# Patient Record
Sex: FEMALE
[De-identification: names, ages, dates, MRNs, and addresses within clinical notes are randomized per-mention and may not be internally consistent; named-entity substitution may affect disease eponyms.]

---

## 2022-08-19 ENCOUNTER — TRANSCRIPTION ENCOUNTER (OUTPATIENT)
Age: 52
End: 2022-08-19

## 2022-08-19 ENCOUNTER — NON-APPOINTMENT (OUTPATIENT)
Age: 52
End: 2022-08-19

## 2022-08-19 ENCOUNTER — APPOINTMENT (OUTPATIENT)
Dept: NEUROLOGY | Facility: CLINIC | Age: 52
End: 2022-08-19

## 2022-08-19 DIAGNOSIS — G43.711 CHRONIC MIGRAINE W/OUT AURA, INTRACTABLE, WITH STATUS MIGRAINOSUS: ICD-10-CM

## 2022-08-19 DIAGNOSIS — G43.001 MIGRAINE W/OUT AURA, NOT INTRACTABLE, WITH STATUS MIGRAINOSUS: ICD-10-CM

## 2022-08-19 DIAGNOSIS — Z86.59 PERSONAL HISTORY OF OTHER MENTAL AND BEHAVIORAL DISORDERS: ICD-10-CM

## 2022-08-19 DIAGNOSIS — Z82.3 FAMILY HISTORY OF STROKE: ICD-10-CM

## 2022-08-19 PROCEDURE — 99204 OFFICE O/P NEW MOD 45 MIN: CPT

## 2022-08-19 RX ORDER — ZOLPIDEM TARTRATE 10 MG/1
10 TABLET, FILM COATED ORAL
Refills: 0 | Status: ACTIVE | COMMUNITY

## 2022-08-19 RX ORDER — ALPRAZOLAM 0.5 MG/1
0.5 TABLET ORAL
Refills: 0 | Status: ACTIVE | COMMUNITY

## 2022-08-19 RX ORDER — UBROGEPANT 100 MG/1
100 TABLET ORAL
Qty: 10 | Refills: 5 | Status: ACTIVE | COMMUNITY
Start: 2022-08-19

## 2022-08-19 RX ORDER — CITALOPRAM HYDROBROMIDE 10 MG/1
10 TABLET, FILM COATED ORAL
Refills: 0 | Status: ACTIVE | COMMUNITY

## 2022-08-19 NOTE — HISTORY OF PRESENT ILLNESS
[Chronic Headache] : chronic headache [> 15 days per month] : > 15 days per month [> 72 hours] : > 72 hours [stayed the same] : stayed the same [7] : a current pain level of 7/10 [9] : a maximum pain level of 9/10 [Increased] : Overall, patient's activity level has increased [___ On how many days in the last 3 months did you miss work or school because of your headaches?] : On how many days in the last 3 months did you miss work or school because of your headaches? [unfilled] day(s) [___ How many days in the last 3 months was your productivity at work or school reduced by half or more] : How many days in the last 3 months was your productivity at work or school reduced by half or more because of your headaches? [unfilled] day(s) [___ On how many days in the last 3 months did you not do household work because of your headaches?] : On how many days in the last 3 months did you not do household work because of your headaches? [unfilled] day(s) [___ How many days in the last 3 months was your productivity in household work reduced by half or more] : How many days in the last 3 months was your productivity in household work reduced by half or more because of headaches? [unfilled] day(s) [___ On how many days in the last 3 months did you miss family, social or leisure activities because of your headaches?] : On how many days in the last 3 months did you miss family, social or leisure activities because of your headaches? [unfilled] day(s) [___ On how many days in the last 3 months did you have a headache?] : On how many days in the last 3 months did you have a headache? [unfilled] days [___ On a scale of 1-10, on average how painful were these headaches? (0 = no pain and 10 = pain as bad as it can be)] : On a scale of 1-10, on average how painful were these headaches? [unfilled] of 10 [0 to 5, MIDAS Grade I, Little or no disability] : 0 to 5, MIDAS Grade I, Little or no disability [Worsened] : The patient reports ~his/her~ symptoms since the last visit have worsened [FreeTextEntry1] : 52-year-old woman with a history of anxiety, migraine headaches, reports since March of this year, been having increasing frequency persistence of headache, described as a sharp, throbbing pain over the right eye can extend backwards, can last the entire day.  Can wake up with it, go to bed with it.  Has tried Tylenol, Excedrin, which mitigates the pain, or returns.  More recently takes Ubrelvy 50 mg, which helps, but usually has to take a second dose.\par Headaches occasional associated with light headedness, dizziness.  No change in vision, no trouble speaking, no unilateral weakness or numbness of the face or extremities.  History of right TMJ pain dysfunction, does not seem correlated to the pain.\par Recently saw an ENT physician, evaluated her, and did not think was related to her headaches.\par For the last several months she has not moved in with her mother, who recently had a stroke in March, and is now taking care of her, she works also full-time from home.  She lives in a small apartment with her  who apparently is having some difficulty accepting the situation.\par She is having trouble sleeping, feels under a lot more stress.  Presently on citalopram 10 mg daily, takes alprazolam as needed and Ambien to help her sleep. [Aura] : no aura [Dizziness] : no dizziness [Nausea] : no nausea [Vomiting] : no Vomiting [Photophobia] : no photophobia [Phonophobia] : no phonophobia [Neck Pain] : no neck pain [Scotoma] : no scotoma [Numbness] : no numbness [Tingling] : no tingling [Weakness] : no weakness [Scalp Tenderness] : no scalp tenderness

## 2022-08-19 NOTE — REVIEW OF SYSTEMS
[As Noted in HPI] : as noted in HPI [Sleep Disturbances] : sleep disturbances [Anxiety] : anxiety [Negative] : Heme/Lymph [FreeTextEntry4] : Sinus disease

## 2022-08-19 NOTE — PHYSICAL EXAM
[General Appearance - Alert] : alert [General Appearance - In No Acute Distress] : in no acute distress [Oriented To Time, Place, And Person] : oriented to person, place, and time [Impaired Insight] : insight and judgment were intact [Affect] : the affect was normal [Person] : oriented to person [Place] : oriented to place [Time] : oriented to time [Concentration Intact] : normal concentrating ability [Visual Intact] : visual attention was ~T not ~L decreased [Naming Objects] : no difficulty naming common objects [Repeating Phrases] : no difficulty repeating a phrase [Writing A Sentence] : no difficulty writing a sentence [Fluency] : fluency intact [Comprehension] : comprehension intact [Reading] : reading intact [Past History] : adequate knowledge of personal past history [Cranial Nerves Optic (II)] : visual acuity intact bilaterally,  visual fields full to confrontation, pupils equal round and reactive to light [Cranial Nerves Oculomotor (III)] : extraocular motion intact [Cranial Nerves Trigeminal (V)] : facial sensation intact symmetrically [Cranial Nerves Facial (VII)] : face symmetrical [Cranial Nerves Vestibulocochlear (VIII)] : hearing was intact bilaterally [Cranial Nerves Glossopharyngeal (IX)] : tongue and palate midline [Cranial Nerves Accessory (XI - Cranial And Spinal)] : head turning and shoulder shrug symmetric [Cranial Nerves Hypoglossal (XII)] : there was no tongue deviation with protrusion [Motor Tone] : muscle tone was normal in all four extremities [Motor Strength] : muscle strength was normal in all four extremities [No Muscle Atrophy] : normal bulk in all four extremities [Sensation Tactile Decrease] : light touch was intact [Abnormal Walk] : normal gait [Balance] : balance was intact [2+] : Ankle jerk left 2+ [Past-pointing] : there was no past-pointing [Tremor] : no tremor present [Plantar Reflex Right Only] : normal on the right [Plantar Reflex Left Only] : normal on the left [Sclera] : the sclera and conjunctiva were normal [PERRL With Normal Accommodation] : pupils were equal in size, round, reactive to light, with normal accommodation [Extraocular Movements] : extraocular movements were intact [Optic Disc Abnormality] : the optic disc were normal in size and color [Full Visual Field] : full visual field [Outer Ear] : the ears and nose were normal in appearance [Hearing Threshold Finger Rub Not [place]] : hearing was normal [Neck Appearance] : the appearance of the neck was normal [] : the neck was supple [Neck Cervical Mass (___cm)] : no neck mass was observed [Respiration, Rhythm And Depth] : normal respiratory rhythm and effort [Exaggerated Use Of Accessory Muscles For Inspiration] : no accessory muscle use [Auscultation Breath Sounds / Voice Sounds] : lungs were clear to auscultation bilaterally [Heart Rate And Rhythm] : heart rate was normal and rhythm regular [Heart Sounds] : normal S1 and S2 [Heart Sounds Gallop] : no gallops [Murmurs] : no murmurs [Arterial Pulses Carotid] : carotid pulses were normal with no bruits [Full Pulse] : the pedal pulses are present [Edema] : there was no peripheral edema [No Spinal Tenderness] : no spinal tenderness

## 2022-08-19 NOTE — ASSESSMENT
[FreeTextEntry1] : 53-year-old woman with a history of migraine headaches, for the last 6 months of having increasing frequency now almost daily, and being under a lot more stress, taking care of her mother who just suffered a stroke.  Also difficulty in her marriage.  Trouble sleeping.\par Headaches are unilateral.  Examination is nonfocal.\par Possible transformed migraines, will like to rule out underlying structural adenopathy, since the headaches are only one-sided.\par Reviewed and discussed possible treatment options, prevention of migraine.\par Plan: MRI brain without contrast.\par Will do a switch from citalopram 10 mg daily, to Effexor Exar 37.5 mg daily, will take for a week, and if tolerated will increase to 75 mg subsequently.\par After 1 week, if tolerates the Effexor XR 75 mg, can discontinue citalopram.\par Samples of Ubrelvy 100 mg tablets, 1 as needed for headache can repeat if necessary in 2 hours.  Prescription also sent to the pharmacy.\par Samples of Nurtec 75 mg sublingual as needed daily for the headache.\par Patient to give a call back in 1 month with feedback on how she is doing.\par Return to office, 2 to 3 months.

## 2022-08-19 NOTE — CONSULT LETTER
[Dear  ___] : Dear  [unfilled], [Courtesy Letter:] : I had the pleasure of seeing your patient, [unfilled], in my office today. [Please see my note below.] : Please see my note below. [Referral Closing:] : Thank you very much for seeing this patient.  If you have any questions, please do not hesitate to contact me. [Sincerely,] : Sincerely, [FreeTextEntry2] : Jose Luis Jordan MD [FreeTextEntry3] : Jey Donovan MD

## 2022-08-22 RX ORDER — RIMEGEPANT SULFATE 75 MG/75MG
75 TABLET, ORALLY DISINTEGRATING ORAL DAILY
Qty: 16 | Refills: 5 | Status: ACTIVE | COMMUNITY
Start: 2022-08-22

## 2022-08-22 RX ORDER — UBROGEPANT 50 MG/1
50 TABLET ORAL
Refills: 0 | Status: DISCONTINUED | COMMUNITY
End: 2022-08-22

## 2022-08-23 ENCOUNTER — TRANSCRIPTION ENCOUNTER (OUTPATIENT)
Age: 52
End: 2022-08-23

## 2022-08-24 ENCOUNTER — TRANSCRIPTION ENCOUNTER (OUTPATIENT)
Age: 52
End: 2022-08-24

## 2022-08-24 RX ORDER — SUMATRIPTAN 100 MG/1
100 TABLET, FILM COATED ORAL
Qty: 12 | Refills: 5 | Status: ACTIVE | COMMUNITY
Start: 2022-08-24 | End: 1900-01-01

## 2022-08-24 RX ORDER — VENLAFAXINE 37.5 MG/1
37.5 TABLET, EXTENDED RELEASE ORAL
Qty: 53 | Refills: 1 | Status: DISCONTINUED | COMMUNITY
Start: 2022-08-19 | End: 2022-08-24

## 2022-08-24 RX ORDER — VENLAFAXINE HYDROCHLORIDE 37.5 MG/1
37.5 CAPSULE, EXTENDED RELEASE ORAL
Qty: 53 | Refills: 1 | Status: ACTIVE | COMMUNITY
Start: 2022-08-24 | End: 1900-01-01

## 2022-08-25 ENCOUNTER — TRANSCRIPTION ENCOUNTER (OUTPATIENT)
Age: 52
End: 2022-08-25

## 2022-08-25 RX ORDER — SUMATRIPTAN 100 MG/1
100 TABLET, FILM COATED ORAL
Qty: 12 | Refills: 5 | Status: ACTIVE | COMMUNITY
Start: 2022-08-25 | End: 1900-01-01

## 2022-08-29 ENCOUNTER — APPOINTMENT (OUTPATIENT)
Dept: MRI IMAGING | Facility: CLINIC | Age: 52
End: 2022-08-29

## 2022-08-29 ENCOUNTER — OUTPATIENT (OUTPATIENT)
Dept: OUTPATIENT SERVICES | Facility: HOSPITAL | Age: 52
LOS: 1 days | End: 2022-08-29
Payer: COMMERCIAL

## 2022-08-29 DIAGNOSIS — G43.711 CHRONIC MIGRAINE WITHOUT AURA, INTRACTABLE, WITH STATUS MIGRAINOSUS: ICD-10-CM

## 2022-08-29 PROCEDURE — 70551 MRI BRAIN STEM W/O DYE: CPT

## 2022-08-29 PROCEDURE — 70551 MRI BRAIN STEM W/O DYE: CPT | Mod: 26

## 2022-08-30 ENCOUNTER — NON-APPOINTMENT (OUTPATIENT)
Age: 52
End: 2022-08-30

## 2022-09-14 ENCOUNTER — NON-APPOINTMENT (OUTPATIENT)
Age: 52
End: 2022-09-14

## 2022-12-21 ENCOUNTER — APPOINTMENT (OUTPATIENT)
Dept: NEUROLOGY | Facility: CLINIC | Age: 52
End: 2022-12-21